# Patient Record
Sex: MALE | Race: WHITE | NOT HISPANIC OR LATINO | Employment: FULL TIME | URBAN - NONMETROPOLITAN AREA
[De-identification: names, ages, dates, MRNs, and addresses within clinical notes are randomized per-mention and may not be internally consistent; named-entity substitution may affect disease eponyms.]

---

## 2017-03-03 ENCOUNTER — APPOINTMENT (OUTPATIENT)
Dept: GENERAL RADIOLOGY | Facility: HOSPITAL | Age: 32
End: 2017-03-03
Attending: FAMILY MEDICINE

## 2017-03-03 PROCEDURE — 73562 X-RAY EXAM OF KNEE 3: CPT

## 2017-06-16 ENCOUNTER — TELEPHONE (OUTPATIENT)
Dept: GASTROENTEROLOGY | Facility: CLINIC | Age: 32
End: 2017-06-16

## 2017-06-16 NOTE — TELEPHONE ENCOUNTER
Patient is out of ppi has ov 7-25-17. i [facility] [phone]. protonix 40mg bid one month 1 refill.

## 2017-10-05 ENCOUNTER — HOSPITAL ENCOUNTER (INPATIENT)
Age: 32
LOS: 5 days | Discharge: HOME | DRG: 885 | End: 2017-10-10
Attending: EMERGENCY MEDICINE | Admitting: PSYCHIATRY & NEUROLOGY
Payer: COMMERCIAL

## 2017-10-05 DIAGNOSIS — F33.9 EPISODE OF RECURRENT MAJOR DEPRESSIVE DISORDER, UNSPECIFIED DEPRESSION EPISODE SEVERITY (HCC): Primary | ICD-10-CM

## 2017-10-05 LAB
ACETAMINOPHEN LEVEL: <15 UG/ML
AMPHETAMINE SCREEN, URINE: NEGATIVE
ANION GAP SERPL CALCULATED.3IONS-SCNC: 13 MMOL/L (ref 7–19)
BARBITURATE SCREEN URINE: NEGATIVE
BASOPHILS ABSOLUTE: 0 K/UL (ref 0–0.2)
BASOPHILS RELATIVE PERCENT: 0.5 % (ref 0–1)
BENZODIAZEPINE SCREEN, URINE: NEGATIVE
BILIRUBIN URINE: ABNORMAL
BLOOD, URINE: NEGATIVE
BUN BLDV-MCNC: 12 MG/DL (ref 6–20)
CALCIUM SERPL-MCNC: 9.4 MG/DL (ref 8.6–10)
CANNABINOID SCREEN URINE: NEGATIVE
CHLORIDE BLD-SCNC: 100 MMOL/L (ref 98–111)
CLARITY: CLEAR
CO2: 24 MMOL/L (ref 22–29)
COCAINE METABOLITE SCREEN URINE: NEGATIVE
COLOR: YELLOW
CREAT SERPL-MCNC: 0.9 MG/DL (ref 0.5–1.2)
EOSINOPHILS ABSOLUTE: 0.1 K/UL (ref 0–0.6)
EOSINOPHILS RELATIVE PERCENT: 1.1 % (ref 0–5)
ETHANOL: <10 MG/DL (ref 0–0.08)
GFR NON-AFRICAN AMERICAN: >60
GLUCOSE BLD-MCNC: 125 MG/DL (ref 74–109)
GLUCOSE URINE: NEGATIVE MG/DL
HCT VFR BLD CALC: 45 % (ref 42–52)
HEMOGLOBIN: 15.8 G/DL (ref 14–18)
KETONES, URINE: ABNORMAL MG/DL
LEUKOCYTE ESTERASE, URINE: NEGATIVE
LYMPHOCYTES ABSOLUTE: 1.4 K/UL (ref 1.1–4.5)
LYMPHOCYTES RELATIVE PERCENT: 18.1 % (ref 20–40)
Lab: NORMAL
MCH RBC QN AUTO: 31.2 PG (ref 27–31)
MCHC RBC AUTO-ENTMCNC: 35.1 G/DL (ref 33–37)
MCV RBC AUTO: 88.9 FL (ref 80–94)
MONOCYTES ABSOLUTE: 0.7 K/UL (ref 0–0.9)
MONOCYTES RELATIVE PERCENT: 8.7 % (ref 0–10)
NEUTROPHILS ABSOLUTE: 5.6 K/UL (ref 1.5–7.5)
NEUTROPHILS RELATIVE PERCENT: 71.3 % (ref 50–65)
NITRITE, URINE: NEGATIVE
OPIATE SCREEN URINE: NEGATIVE
PDW BLD-RTO: 12.6 % (ref 11.5–14.5)
PH UA: 6.5
PLATELET # BLD: 297 K/UL (ref 130–400)
PMV BLD AUTO: 10.4 FL (ref 9.4–12.4)
POTASSIUM SERPL-SCNC: 4 MMOL/L (ref 3.5–5)
PROTEIN UA: NEGATIVE MG/DL
RBC # BLD: 5.06 M/UL (ref 4.7–6.1)
SALICYLATE, SERUM: <3 MG/DL (ref 3–10)
SODIUM BLD-SCNC: 137 MMOL/L (ref 136–145)
SPECIFIC GRAVITY UA: 1.03
UROBILINOGEN, URINE: 1 E.U./DL
WBC # BLD: 7.9 K/UL (ref 4.8–10.8)

## 2017-10-05 PROCEDURE — 36415 COLL VENOUS BLD VENIPUNCTURE: CPT

## 2017-10-05 PROCEDURE — 80307 DRUG TEST PRSMV CHEM ANLYZR: CPT

## 2017-10-05 PROCEDURE — 80048 BASIC METABOLIC PNL TOTAL CA: CPT

## 2017-10-05 PROCEDURE — 6370000000 HC RX 637 (ALT 250 FOR IP): Performed by: PSYCHIATRY & NEUROLOGY

## 2017-10-05 PROCEDURE — 99285 EMERGENCY DEPT VISIT HI MDM: CPT

## 2017-10-05 PROCEDURE — G0480 DRUG TEST DEF 1-7 CLASSES: HCPCS

## 2017-10-05 PROCEDURE — 99284 EMERGENCY DEPT VISIT MOD MDM: CPT | Performed by: NURSE PRACTITIONER

## 2017-10-05 PROCEDURE — 85025 COMPLETE CBC W/AUTO DIFF WBC: CPT

## 2017-10-05 PROCEDURE — 1240000000 HC EMOTIONAL WELLNESS R&B

## 2017-10-05 PROCEDURE — 81003 URINALYSIS AUTO W/O SCOPE: CPT

## 2017-10-05 RX ORDER — CLONIDINE HYDROCHLORIDE 0.1 MG/1
0.1 TABLET ORAL EVERY 6 HOURS PRN
Status: DISCONTINUED | OUTPATIENT
Start: 2017-10-05 | End: 2017-10-10 | Stop reason: HOSPADM

## 2017-10-05 RX ORDER — TRAZODONE HYDROCHLORIDE 50 MG/1
50 TABLET ORAL ONCE
Status: DISCONTINUED | OUTPATIENT
Start: 2017-10-05 | End: 2017-10-05

## 2017-10-05 RX ORDER — TRAZODONE HYDROCHLORIDE 50 MG/1
50 TABLET ORAL NIGHTLY PRN
Status: DISCONTINUED | OUTPATIENT
Start: 2017-10-05 | End: 2017-10-10 | Stop reason: HOSPADM

## 2017-10-05 RX ORDER — FOLIC ACID 1 MG/1
1 TABLET ORAL DAILY
Status: DISCONTINUED | OUTPATIENT
Start: 2017-10-06 | End: 2017-10-10 | Stop reason: HOSPADM

## 2017-10-05 RX ORDER — MULTIVITAMIN WITH FOLIC ACID 400 MCG
1 TABLET ORAL DAILY
Status: DISCONTINUED | OUTPATIENT
Start: 2017-10-06 | End: 2017-10-10 | Stop reason: HOSPADM

## 2017-10-05 RX ORDER — LORAZEPAM 1 MG/1
2 TABLET ORAL EVERY 4 HOURS PRN
Status: DISCONTINUED | OUTPATIENT
Start: 2017-10-05 | End: 2017-10-06

## 2017-10-05 RX ORDER — THIAMINE MONONITRATE (VIT B1) 100 MG
100 TABLET ORAL DAILY
Status: DISCONTINUED | OUTPATIENT
Start: 2017-10-06 | End: 2017-10-10 | Stop reason: HOSPADM

## 2017-10-05 RX ORDER — TRAZODONE HYDROCHLORIDE 50 MG/1
50 TABLET ORAL NIGHTLY
Status: DISCONTINUED | OUTPATIENT
Start: 2017-10-05 | End: 2017-10-05

## 2017-10-05 RX ORDER — TRAZODONE HYDROCHLORIDE 50 MG/1
50 TABLET ORAL
Status: DISCONTINUED | OUTPATIENT
Start: 2017-10-05 | End: 2017-10-05

## 2017-10-05 RX ORDER — LORAZEPAM 2 MG/ML
2 CONCENTRATE ORAL EVERY 4 HOURS PRN
Status: DISCONTINUED | OUTPATIENT
Start: 2017-10-05 | End: 2017-10-05

## 2017-10-05 RX ORDER — CHLORDIAZEPOXIDE HYDROCHLORIDE 25 MG/1
50 CAPSULE, GELATIN COATED ORAL ONCE
Status: COMPLETED | OUTPATIENT
Start: 2017-10-05 | End: 2017-10-06

## 2017-10-05 RX ORDER — CLONIDINE HYDROCHLORIDE 0.1 MG/1
0.1 TABLET ORAL ONCE
Status: DISCONTINUED | OUTPATIENT
Start: 2017-10-05 | End: 2017-10-10 | Stop reason: HOSPADM

## 2017-10-05 RX ORDER — ACETAMINOPHEN 325 MG/1
650 TABLET ORAL EVERY 4 HOURS PRN
Status: DISCONTINUED | OUTPATIENT
Start: 2017-10-05 | End: 2017-10-10 | Stop reason: HOSPADM

## 2017-10-05 RX ADMIN — LORAZEPAM 2 MG: 1 TABLET ORAL at 21:50

## 2017-10-05 RX ADMIN — TRAZODONE HYDROCHLORIDE 50 MG: 50 TABLET ORAL at 21:41

## 2017-10-05 RX ADMIN — CLONIDINE HYDROCHLORIDE 0.1 MG: 0.1 TABLET ORAL at 20:29

## 2017-10-05 ASSESSMENT — ENCOUNTER SYMPTOMS
SHORTNESS OF BREATH: 0
NAUSEA: 0
BLOOD IN STOOL: 0
VOMITING: 0
ABDOMINAL PAIN: 0
CONSTIPATION: 0
DIARRHEA: 0
CHEST TIGHTNESS: 0
EYES NEGATIVE: 1
COUGH: 0

## 2017-10-05 ASSESSMENT — SLEEP AND FATIGUE QUESTIONNAIRES
DIFFICULTY ARISING: YES
RESTFUL SLEEP: NO
AVERAGE NUMBER OF SLEEP HOURS: 2
DIFFICULTY STAYING ASLEEP: YES
DO YOU USE A SLEEP AID: YES
DO YOU HAVE DIFFICULTY SLEEPING: YES
DIFFICULTY FALLING ASLEEP: YES
SLEEP PATTERN: INSOMNIA

## 2017-10-05 ASSESSMENT — PATIENT HEALTH QUESTIONNAIRE - PHQ9: SUM OF ALL RESPONSES TO PHQ QUESTIONS 1-9: 21

## 2017-10-05 ASSESSMENT — LIFESTYLE VARIABLES: HISTORY_ALCOHOL_USE: YES
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
## 2017-10-05 NOTE — ED NOTES
Assessment:    Pt respirations even and unlabored, skin color within normal limits. Skin is warm and dry. Capillary refill less than 2 seconds. Alert and oriented x 4. Pt is in no acute distress. Affect normal, pt denies SI and HI. Call light within reach, pt gowned. Suicide and elopement precautions initiated.         Bin Valverde RN  10/05/17 7916

## 2017-10-06 PROCEDURE — G0008 ADMIN INFLUENZA VIRUS VAC: HCPCS | Performed by: PSYCHIATRY & NEUROLOGY

## 2017-10-06 PROCEDURE — 90686 IIV4 VACC NO PRSV 0.5 ML IM: CPT | Performed by: PSYCHIATRY & NEUROLOGY

## 2017-10-06 PROCEDURE — 1240000000 HC EMOTIONAL WELLNESS R&B

## 2017-10-06 PROCEDURE — 6370000000 HC RX 637 (ALT 250 FOR IP): Performed by: PSYCHIATRY & NEUROLOGY

## 2017-10-06 PROCEDURE — 3E0234Z INTRODUCTION OF SERUM, TOXOID AND VACCINE INTO MUSCLE, PERCUTANEOUS APPROACH: ICD-10-PCS | Performed by: PSYCHIATRY & NEUROLOGY

## 2017-10-06 PROCEDURE — 6370000000 HC RX 637 (ALT 250 FOR IP): Performed by: NURSE PRACTITIONER

## 2017-10-06 PROCEDURE — 90792 PSYCH DIAG EVAL W/MED SRVCS: CPT | Performed by: NURSE PRACTITIONER

## 2017-10-06 PROCEDURE — 6360000002 HC RX W HCPCS: Performed by: PSYCHIATRY & NEUROLOGY

## 2017-10-06 RX ORDER — QUETIAPINE FUMARATE 50 MG/1
50 TABLET, FILM COATED ORAL NIGHTLY
Status: DISCONTINUED | OUTPATIENT
Start: 2017-10-06 | End: 2017-10-10 | Stop reason: HOSPADM

## 2017-10-06 RX ORDER — PANTOPRAZOLE SODIUM 40 MG/1
40 TABLET, DELAYED RELEASE ORAL 2 TIMES DAILY
Status: DISCONTINUED | OUTPATIENT
Start: 2017-10-06 | End: 2017-10-10 | Stop reason: HOSPADM

## 2017-10-06 RX ORDER — ESCITALOPRAM OXALATE 10 MG/1
10 TABLET ORAL DAILY
Status: DISCONTINUED | OUTPATIENT
Start: 2017-10-06 | End: 2017-10-10 | Stop reason: HOSPADM

## 2017-10-06 RX ORDER — CHLORDIAZEPOXIDE HYDROCHLORIDE 25 MG/1
25 CAPSULE, GELATIN COATED ORAL 3 TIMES DAILY
Status: DISCONTINUED | OUTPATIENT
Start: 2017-10-06 | End: 2017-10-10 | Stop reason: HOSPADM

## 2017-10-06 RX ADMIN — CHLORDIAZEPOXIDE HYDROCHLORIDE 50 MG: 25 CAPSULE ORAL at 00:05

## 2017-10-06 RX ADMIN — ESCITALOPRAM OXALATE 10 MG: 10 TABLET ORAL at 12:48

## 2017-10-06 RX ADMIN — QUETIAPINE FUMARATE 50 MG: 50 TABLET, FILM COATED ORAL at 20:13

## 2017-10-06 RX ADMIN — FOLIC ACID 1 MG: 1 TABLET ORAL at 08:22

## 2017-10-06 RX ADMIN — INFLUENZA A VIRUS A/CALIFORNIA/7/2009 X-179A (H1N1) ANTIGEN (FORMALDEHYDE INACTIVATED), INFLUENZA A VIRUS A/TEXAS/50/2012 X-223A (H3N2) ANTIGEN (FORMALDEHYDE INACTIVATED), INFLUENZA B VIRUS B/MASSACHUSETTS/2/2012 BX-51B ANTIGEN (FORMALDEHYDE INACTIVATED), AND INFLUENZA B VIRUS B/BRISBANE/60/2008 ANTIGEN (FORMALDEHYDE INACTIVATED) 0.5 ML: 15; 15; 15; 15 INJECTION, SUSPENSION INTRAMUSCULAR at 11:15

## 2017-10-06 RX ADMIN — PANTOPRAZOLE SODIUM 40 MG: 40 TABLET, DELAYED RELEASE ORAL at 20:11

## 2017-10-06 RX ADMIN — CHLORDIAZEPOXIDE HYDROCHLORIDE 25 MG: 25 CAPSULE ORAL at 20:11

## 2017-10-06 RX ADMIN — CHLORDIAZEPOXIDE HYDROCHLORIDE 25 MG: 25 CAPSULE ORAL at 13:34

## 2017-10-06 RX ADMIN — Medication 100 MG: at 08:22

## 2017-10-06 RX ADMIN — PANTOPRAZOLE SODIUM 40 MG: 40 TABLET, DELAYED RELEASE ORAL at 13:34

## 2017-10-06 RX ADMIN — THERA TABS 1 TABLET: TAB at 08:22

## 2017-10-06 NOTE — H&P
HISTORY and PHYSICAL      CHIEF COMPLAINT:  depression    Reason for Admission:  depression    History Obtained From:  patient    HISTORY OF PRESENT ILLNESS:      The patient is a 27 y.o. male who is admitted to the [place] [address] unit with worsening mood issues. He has no c/o CP or SOA. No abdominal pain or N/V. No dysuria. No fevers or recent illnesses. He has had no pain complaints. He has not been sleeping well. Past Medical History:        Diagnosis Date    Parkinson's disease [facility])      Past Surgical History:    History reviewed. No pertinent surgical history. Medications Prior to Admission:    Prescriptions Prior to Admission: PARoxetine (PAXIL) 30 MG tablet, Take 40 mg by mouth every morning   Pantoprazole Sodium (PROTONIX PO), Take 40 mg by mouth 2 times daily   zolpidem (AMBIEN CR) 12.5 MG extended release tablet, Take 12.5 mg by mouth nightly as needed for Sleep    Allergies:  Review of patient's allergies indicates no known allergies. Social History:   TOBACCO:   reports that he has never smoked. He does not have any smokeless tobacco history on file. ETOH:   reports that he drinks alcohol. DRUGS:   reports that he does not use illicit drugs. MARITAL STATUS:    OCCUPATION:  Not working  Patient currently lives alone      Family History:   History reviewed. No pertinent family history.   REVIEW OF SYSTEMS:  Constitutional: neg  CV: neg  Pulmonary: neg  GI: neg  : neg  Psych: depression, insomnia  Neuro: neg  Skin: neg  MusculoSkeletal: neg  HEENT: neg  Joints: neg    Vitals:  /80  Pulse 90  Temp 96.3 °F (35.7 °C)  Resp 16  Ht 5' 8\" (1.727 m)  Wt 190 lb (86.2 kg)  SpO2 100%  BMI 28.89 kg/m2    PHYSICAL EXAM:  Gen: NAD, alert  HEENT: WNL  Lymph: no LAD  Neck: no JVD or masses  Chest: CTA bilat  CV: RRR  Abdomen: NT/ND  Extrem: no C/C/E  Neuro: non focal  Skin: no rashes  Joints: no redness    DATA:  I have reviewed the

## 2017-10-06 NOTE — PROGRESS NOTES
Admission Note      Reason for admission/Target Symptom: increasing depression and SI  Diagnoses:  UDS: Negative- Benzo- Opiate- Amphetamines- THC- Cocaine- Barbs  BAL: Negative     SW met with treatment team to discuss patient treatment and follow up care plans. Pt was admitted to [facility]. Treatment team has  identified patients discharge needs as medication management and therapy with [address] [place] [address]. Pt validated need for appointments. Pt was also provided a handout of contact information for drug and alcohol treatment centers and other community support service such as [facility] .

## 2017-10-06 NOTE — H&P
admissions and no previous suicide attempts. Historian: patient  Complaint Type: anxiety, decreased appetite, depression, excessive alcohol consumption, fatigue, irritability, loss of interest in favorite activities, mood swings and sleep disturbance  Course of Symptoms: ongoing  Symptoms Onset: gradual  Onset Approximately: gradual  Precipitating Factors: job loss, divorce  Severity: marked  Risk Factors:   History of mental illness  Allergies:    Allergies as of 10/05/2017    (No Known Allergies)       Vital Signs:  Last set of tests and vitals:  Vitals:    10/06/17 0742   BP: 116/80   Pulse: 90   Resp: 16   Temp: 96.3 °F (35.7 °C)   SpO2: 100%     Labs Reviewed   CBC WITH AUTO DIFFERENTIAL - Abnormal; Notable for the following:        Result Value    MCH 31.2 (*)     Neutrophils % 71.3 (*)     Lymphocytes % 18.1 (*)     All other components within normal limits   BASIC METABOLIC PANEL - Abnormal; Notable for the following:     Glucose 125 (*)     All other components within normal limits   URINALYSIS - Abnormal; Notable for the following:     Bilirubin Urine SMALL (*)     Ketones, Urine TRACE (*)     All other components within normal limits   URINE DRUG SCREEN   ETHANOL   SALICYLATE LEVEL   ACETAMINOPHEN LEVEL       Current Medications:   Current Facility-Administered Medications   Medication Dose Route Frequency Provider Last Rate Last Dose    acetaminophen (TYLENOL) tablet 650 mg  650 mg Oral Q4H PRN Amando Means MD        magnesium hydroxide (MILK OF MAGNESIA) 400 MG/5ML suspension 30 mL  30 mL Oral Daily PRN Amando Means MD        influenza quadrivalent split vaccine (FLUZONE;FLUARIX) injection 0.5 mL  0.5 mL Intramuscular Once Parker Ford MD Miguelangel        cloNIDine (CATAPRES) tablet 0.1 mg  0.1 mg Oral Q6H PRN Lizbeth Haq MD   0.1 mg at 10/05/17 2029    vitamin B-1 (THIAMINE) tablet 100 mg  100 mg Oral Daily Lizbeth Haq MD   100 mg at 77/82/59 8343    folic acid (Tere Churchman) ROS: negative  Allergy and Immunology ROS: negative  Hematological and Lymphatic ROS: negative  Endocrine ROS: negative  Breast ROS: negative  Respiratory ROS: negative  Cardiovascular ROS: negative  Gastrointestinal ROS: negative  Genito-Urinary ROS: negative  Musculoskeletal ROS: negative  Neurological ROS: negative  Dermatological ROS: negative      DSM V Diagnoses:           ACTIVE MEDICAL PROBLEMS:  Patient Active Problem List   Diagnosis    Moderate episode of recurrent major depressive disorder ([facility] [address]       Recommendations:    1. Admit to [facility] Unit and monitor on 15 min checks  2. Asiya Santoro to be reviewed. 3. Collateral information from family with release  4. Medical monitoring by Dr Franklyn Javed and associates  5. Acclimate to the unit and encourage group attendance  6. Reconcile home medications  7. CIWAS every 4 hours  8. Librium 25 mg po TID -TAPER AS NEEDED  9. Folic Acid 1 mg po daily  10. Thiamine 100 mg po daily  11.  Seroquel 50 mg po nightly      JUAN JOSE Montelongo

## 2017-10-06 NOTE — PLAN OF CARE
Problem: Altered Mood, Depressive Behavior  Goal: STG-Knowledge of positive coping patterns  Outcome: Ongoing                                                                      Group Therapy Note     Date: 10/6/2017  Start Time: 1430  End Time:  2491  Number of Participants: 10     Type of Group: Cognitive     Wellness Binder Information  Module Name:  Women's Issues  Session Number:  1     Patient's Goal:  To raise awareness of the effectiveness of assertive communication     Notes:  Pt participated in group discussion, identified types of communication behavior, discussed the effectiveness of assertive communication.      Status After Intervention:  Unchanged     Participation Level: Interactive     Participation Quality: Attentive        Speech:  normal        Thought Process/Content: Logical        Affective Functioning: Congruent        Mood: anxious and depressed        Level of consciousness:  Alert and Oriented x4        Response to Learning: Able to verbalize current knowledge/experience, Able to verbalize/acknowledge new learning and Progressing to goal        Endings: None Reported     Modes of Intervention: Education        Discipline Responsible: Psychoeducational Specialist        Signature:  Haylee Haynes

## 2017-10-06 NOTE — PLAN OF CARE
Problem: Health Maintenance - Impaired:  Goal: Ability to perform activities of daily living will improve  Ability to perform activities of daily living will improve   Outcome: Ongoing  Goal: Able to sleep without medication for appropriate length of time  Able to sleep without medication for appropriate length of time   Outcome: Ongoing  Goal: Maintenance of adequate nutrition will improve  Maintenance of adequate nutrition will improve   Outcome: Ongoing    Problem: Mood - Altered:  Goal: Mood stable  Mood stable   Outcome: Ongoing    Problem: Self-Esteem - Low:  Goal: Demonstrates positive self-esteem  Demonstrates positive self-esteem   Outcome: Ongoing    Problem: Falls - Risk of  Goal: Absence of falls  Outcome: Ongoing

## 2017-10-06 NOTE — PROGRESS NOTES
Requirement Note     SW met with pt to complete Psychosocial within 72 hours, CSSRS within 24 hours, and treatment plan signature sheet within 72 hours. SW explained treatment goals with pt. Decreasing depression and anxiety by improvement of positive coping patterns was discussed. Pt acknowledged understanding of treatment goals and signed treatment plan signature sheet. In the last 6 months has the pt been a danger to self: /NO  In the last 6 months has the pt been a danger to others: NO    Provided pt with [employer] handout entitled \"Quitting Smoking. \"  Reviewed handout with pt addressing dangers of smoking, developing coping skills, and providing basic information about quitting. Patient declined practical counseling of tobacco practical counseling during the hospital stay.

## 2017-10-07 LAB
HBA1C MFR BLD: 5.5 %
TSH SERPL DL<=0.05 MIU/L-ACNC: 1.87 UIU/ML (ref 0.27–4.2)
VITAMIN B-12: 258 PG/ML (ref 211–946)
VITAMIN D 25-HYDROXY: 37.6 NG/ML

## 2017-10-07 PROCEDURE — 1240000000 HC EMOTIONAL WELLNESS R&B

## 2017-10-07 PROCEDURE — 6370000000 HC RX 637 (ALT 250 FOR IP): Performed by: PSYCHIATRY & NEUROLOGY

## 2017-10-07 PROCEDURE — 6370000000 HC RX 637 (ALT 250 FOR IP): Performed by: NURSE PRACTITIONER

## 2017-10-07 PROCEDURE — 82607 VITAMIN B-12: CPT

## 2017-10-07 PROCEDURE — 83036 HEMOGLOBIN GLYCOSYLATED A1C: CPT

## 2017-10-07 PROCEDURE — 36415 COLL VENOUS BLD VENIPUNCTURE: CPT

## 2017-10-07 PROCEDURE — 84443 ASSAY THYROID STIM HORMONE: CPT

## 2017-10-07 PROCEDURE — 82306 VITAMIN D 25 HYDROXY: CPT

## 2017-10-07 PROCEDURE — 99231 SBSQ HOSP IP/OBS SF/LOW 25: CPT | Performed by: PSYCHIATRY & NEUROLOGY

## 2017-10-07 RX ADMIN — CHLORDIAZEPOXIDE HYDROCHLORIDE 25 MG: 25 CAPSULE ORAL at 13:24

## 2017-10-07 RX ADMIN — THERA TABS 1 TABLET: TAB at 08:31

## 2017-10-07 RX ADMIN — FOLIC ACID 1 MG: 1 TABLET ORAL at 08:31

## 2017-10-07 RX ADMIN — CHLORDIAZEPOXIDE HYDROCHLORIDE 25 MG: 25 CAPSULE ORAL at 21:29

## 2017-10-07 RX ADMIN — Medication 100 MG: at 08:31

## 2017-10-07 RX ADMIN — PANTOPRAZOLE SODIUM 40 MG: 40 TABLET, DELAYED RELEASE ORAL at 21:29

## 2017-10-07 RX ADMIN — PANTOPRAZOLE SODIUM 40 MG: 40 TABLET, DELAYED RELEASE ORAL at 08:31

## 2017-10-07 RX ADMIN — QUETIAPINE FUMARATE 50 MG: 50 TABLET, FILM COATED ORAL at 21:28

## 2017-10-07 RX ADMIN — ESCITALOPRAM OXALATE 10 MG: 10 TABLET ORAL at 08:31

## 2017-10-07 RX ADMIN — CHLORDIAZEPOXIDE HYDROCHLORIDE 25 MG: 25 CAPSULE ORAL at 08:31

## 2017-10-07 RX ADMIN — TRAZODONE HYDROCHLORIDE 50 MG: 50 TABLET ORAL at 21:28

## 2017-10-07 NOTE — PROGRESS NOTES
Group Therapy Note    Date: 10/07/17                                                                                                                                                                                               Number of Participants: 13    Type of Group:morning group    Wellness Binder [id number]  Session Number:    Patient's Goal: any thin    Status After Intervention:  same    Participation Level: fair    Participation Quality: fair          Mood: good          Signature:  Danelle Vallejo

## 2017-10-07 NOTE — PLAN OF CARE
Problem: Altered Mood, Depressive Behavior  Goal: STG-Knowledge of positive coping patterns  Outcome: Ongoing

## 2017-10-07 NOTE — PROGRESS NOTES
Group Therapy Note:    Date: 10/06/17  Time: 19:30 to 20:30    Type of Group: Wrap-Up Group Session    Patient Goals: See patient self inventory sheet. Notes: Patient participated in group.       Paulette CONTRERAS

## 2017-10-08 PROCEDURE — 6370000000 HC RX 637 (ALT 250 FOR IP): Performed by: NURSE PRACTITIONER

## 2017-10-08 PROCEDURE — 1240000000 HC EMOTIONAL WELLNESS R&B

## 2017-10-08 PROCEDURE — 6370000000 HC RX 637 (ALT 250 FOR IP): Performed by: PSYCHIATRY & NEUROLOGY

## 2017-10-08 RX ADMIN — Medication 100 MG: at 08:20

## 2017-10-08 RX ADMIN — TRAZODONE HYDROCHLORIDE 50 MG: 50 TABLET ORAL at 22:51

## 2017-10-08 RX ADMIN — CHLORDIAZEPOXIDE HYDROCHLORIDE 25 MG: 25 CAPSULE ORAL at 13:52

## 2017-10-08 RX ADMIN — PANTOPRAZOLE SODIUM 40 MG: 40 TABLET, DELAYED RELEASE ORAL at 08:20

## 2017-10-08 RX ADMIN — CHLORDIAZEPOXIDE HYDROCHLORIDE 25 MG: 25 CAPSULE ORAL at 20:39

## 2017-10-08 RX ADMIN — ESCITALOPRAM OXALATE 10 MG: 10 TABLET ORAL at 08:20

## 2017-10-08 RX ADMIN — FOLIC ACID 1 MG: 1 TABLET ORAL at 08:20

## 2017-10-08 RX ADMIN — THERA TABS 1 TABLET: TAB at 08:20

## 2017-10-08 RX ADMIN — PANTOPRAZOLE SODIUM 40 MG: 40 TABLET, DELAYED RELEASE ORAL at 20:39

## 2017-10-08 RX ADMIN — CHLORDIAZEPOXIDE HYDROCHLORIDE 25 MG: 25 CAPSULE ORAL at 08:20

## 2017-10-08 RX ADMIN — QUETIAPINE FUMARATE 50 MG: 50 TABLET, FILM COATED ORAL at 20:39

## 2017-10-08 NOTE — PLAN OF CARE
Problem: Health Maintenance - Impaired:  Goal: Ability to perform activities of daily living will improve  Ability to perform activities of daily living will improve   Outcome: Ongoing    Goal: Able to sleep without medication for appropriate length of time  Able to sleep without medication for appropriate length of time   Outcome: Ongoing    Goal: Maintenance of adequate nutrition will improve  Maintenance of adequate nutrition will improve   Outcome: Ongoing      Problem: Mood - Altered:  Goal: Mood stable  Mood stable   Outcome: Ongoing      Problem: Self-Esteem - Low:  Goal: Demonstrates positive self-esteem  Demonstrates positive self-esteem   Outcome: Ongoing      Problem: Altered Mood, Depressive Behavior  Goal: LTG-Able to verbalize acceptance of life and situations over which he or she has no control  Outcome: Ongoing    Goal: LTG-Able to verbalize and/or display a decrease in depressive symptoms  Outcome: Ongoing    Goal: STG-Able to verbalize suicidal ideations  Outcome: Ongoing    Goal: STG-Able to verbalize support system  Outcome: Ongoing    Goal: LTG-Absence of self-harm  Outcome: Ongoing    Goal: STG-Knowledge of positive coping patterns  Outcome: Ongoing

## 2017-10-08 NOTE — PLAN OF CARE
Problem: Altered Mood, Depressive Behavior  Goal: LTG-Able to verbalize and/or display a decrease in depressive symptoms  Outcome: Ongoing                                                                      Group Therapy Note    Date: 10/8/2017  Start Time: 4935  End Time:  1100  Number of Participants: 16    Type of Group: Recovery    Wellness Binder Information  Module Name:  Spiritual Wellness  Session Number:  5. Patient's Goal:  Meaning    Notes: Pt discussed the meaning of values and they shared the things that are most important in their lives. Pt learned that everyone has a purpose in this life and we explored things that they enjoy and want to do in their life to try and help find their purpose. Pt learned that if they focus on the important things and what they are meant for, the depression will most likely decrease over time. Status After Intervention:  Improved    Participation Level:  Active Listener    Participation Quality: Appropriate      Speech:  normal      Thought Process/Content: Logical      Affective Functioning: Congruent      Mood: depressed      Level of consciousness:  Alert, Oriented x4 and Attentive      Response to Learning: Able to verbalize current knowledge/experience, Able to retain information and Progressing to goal      Endings: None Reported    Modes of Intervention: Support and Exploration      Discipline Responsible: Psychoeducational Specialist      Signature:  Mark Elizabeth

## 2017-10-08 NOTE — PROGRESS NOTES
Group Therapy Note    Date: 10/7/2017  Start Time: 2100  End Time:  2115  Number of Participants: 12    Type of Group: Wrap-Up    Wellness Binder Information  Module Name:    Session Number:      Patient's Goal:  Did not verbalize    Notes:  Filled out wrap up sheet    Status After Intervention:      Participation Level:     Participation Quality:       Speech:         Thought Process/Content:      Affective Functioning:       Mood:       Level of consciousness:        Response to Learning:       Endings:     Modes of Intervention:       Discipline Responsible: [address] [place] [address]      Signature:  Dania Galindo

## 2017-10-09 PROCEDURE — 6370000000 HC RX 637 (ALT 250 FOR IP): Performed by: PSYCHIATRY & NEUROLOGY

## 2017-10-09 PROCEDURE — 1240000000 HC EMOTIONAL WELLNESS R&B

## 2017-10-09 PROCEDURE — 99231 SBSQ HOSP IP/OBS SF/LOW 25: CPT | Performed by: PSYCHIATRY & NEUROLOGY

## 2017-10-09 PROCEDURE — 6370000000 HC RX 637 (ALT 250 FOR IP): Performed by: NURSE PRACTITIONER

## 2017-10-09 RX ORDER — CHOLECALCIFEROL (VITAMIN D3) 125 MCG
500 CAPSULE ORAL DAILY
Status: DISCONTINUED | OUTPATIENT
Start: 2017-10-10 | End: 2017-10-10 | Stop reason: HOSPADM

## 2017-10-09 RX ADMIN — THERA TABS 1 TABLET: TAB at 08:01

## 2017-10-09 RX ADMIN — FOLIC ACID 1 MG: 1 TABLET ORAL at 08:01

## 2017-10-09 RX ADMIN — CHLORDIAZEPOXIDE HYDROCHLORIDE 25 MG: 25 CAPSULE ORAL at 21:19

## 2017-10-09 RX ADMIN — PANTOPRAZOLE SODIUM 40 MG: 40 TABLET, DELAYED RELEASE ORAL at 21:19

## 2017-10-09 RX ADMIN — CHLORDIAZEPOXIDE HYDROCHLORIDE 25 MG: 25 CAPSULE ORAL at 08:01

## 2017-10-09 RX ADMIN — TRAZODONE HYDROCHLORIDE 50 MG: 50 TABLET ORAL at 21:22

## 2017-10-09 RX ADMIN — QUETIAPINE FUMARATE 50 MG: 50 TABLET, FILM COATED ORAL at 21:19

## 2017-10-09 RX ADMIN — ESCITALOPRAM OXALATE 10 MG: 10 TABLET ORAL at 08:01

## 2017-10-09 RX ADMIN — CHLORDIAZEPOXIDE HYDROCHLORIDE 25 MG: 25 CAPSULE ORAL at 16:05

## 2017-10-09 RX ADMIN — PANTOPRAZOLE SODIUM 40 MG: 40 TABLET, DELAYED RELEASE ORAL at 08:01

## 2017-10-09 RX ADMIN — Medication 100 MG: at 08:01

## 2017-10-09 NOTE — PLAN OF CARE
Problem: Altered Mood, Depressive Behavior  Goal: STG-Knowledge of positive coping patterns  Outcome: Ongoing                                                                      Group Therapy Note    Date: 10/9/2017  Start Time: 1430  End Time:  6850  Number of Participants: 16    Type of Group: Cognitive Skills    Wellness Binder Information  Module Name:  Spiritual wellness  Session Number:  1    Patient's Goal:  Hope    Notes:  Pt acknowledged to make positive changes you must remain hopeful.     Status After Intervention:  Improved    Participation Level: Interactive    Participation Quality: Appropriate and Attentive      Speech:  normal      Thought Process/Content: Logical      Affective Functioning: Congruent      Mood: congruent      Level of consciousness:  Alert, Oriented x4 and Attentive      Response to Learning: Able to verbalize current knowledge/experience      Endings: None Reported    Modes of Intervention: Education      Discipline Responsible: Psychoeducational Specialist      Signature:  Navdeep Mesa

## 2017-10-09 NOTE — PLAN OF CARE
Problem: Health Maintenance - Impaired:  Goal: Ability to perform activities of daily living will improve  Ability to perform activities of daily living will improve   Outcome: Ongoing    Goal: Able to sleep without medication for appropriate length of time  Able to sleep without medication for appropriate length of time   Outcome: Ongoing    Goal: Maintenance of adequate nutrition will improve  Maintenance of adequate nutrition will improve   Outcome: Ongoing      Problem: Mood - Altered:  Goal: Mood stable  Mood stable   Outcome: Ongoing      Problem: Self-Esteem - Low:  Goal: Demonstrates positive self-esteem  Demonstrates positive self-esteem   Outcome: Ongoing      Problem: Falls - Risk of  Goal: Absence of falls  Outcome: Ongoing      Problem: Altered Mood, Depressive Behavior  Goal: STG-Able to verbalize suicidal ideations  Outcome: Ongoing

## 2017-10-09 NOTE — PROGRESS NOTES
10/9/2017 12:17 PM   Progress Note        Jacob Monge 1985  Psychotherapy Time Spent: 15 min      Psychotherapy Topics: health    Chief Complaint   Patient presents with    Mental Health Problem     presents to er with c/o anxiety and depression, denies si and hi       Subjective:  Patient seen today. He says he slept very well with trazodone. Rates depression and anxiety as a 0. No suicidal ideas, intentions or plans. Patient reports side effects as follows: none. Reports no suicidal ideation. Reports compliance with medications as good . Review of Systems - all negative    Current Meds:    Prior to Admission medications    Medication Sig Start Date End Date Taking? Authorizing Provider   PARoxetine (PAXIL) 30 MG tablet Take 40 mg by mouth every morning    Yes Historical Provider, MD   Pantoprazole Sodium (PROTONIX PO) Take 40 mg by mouth 2 times daily    Yes Historical Provider, MD   zolpidem (AMBIEN CR) 12.5 MG extended release tablet Take 12.5 mg by mouth nightly as needed for Sleep   Yes Historical Provider, MD       @    MSE:  Patient is  A & O x3. Appearance:  well-appearing  Cognition:  Recent memory intact , remote memory intact , good fund of knowledge, average intelligence level. Speech:  normal  Language: Naming: intact; Word Finding: intact  Conversation no evidence of delusions  Behavior:  Cooperative  Mood: improved  Affect: congruent with mood and full range  Thought Content: negative delusions, hallucinations, obsessions, homicidal and suicidal  Thought Process: linear and goal directed  Judgement Insight:  improved and appropriate  Gait and Station:normal gait and station   Musculoskeletal:    Assesment: No diagnosis found. Plan:  1. The patient continues to need, on a daily basis, active treatment furnished directly by or requiring the supervision of inpatient psychiatric facility personnel. 2. Same medications for now  3.  Supportive therapy offered    Paz Gomes M. D.   [facility]       [unfilled]

## 2017-10-09 NOTE — PLAN OF CARE
Problem: Health Maintenance - Impaired:  Goal: Ability to perform activities of daily living will improve  Ability to perform activities of daily living will improve   Outcome: Ongoing    Goal: Able to sleep without medication for appropriate length of time  Able to sleep without medication for appropriate length of time   Outcome: Ongoing    Goal: Maintenance of adequate nutrition will improve  Maintenance of adequate nutrition will improve   Outcome: Ongoing      Problem: Mood - Altered:  Goal: Mood stable  Mood stable   Outcome: Ongoing      Problem: Self-Esteem - Low:  Goal: Demonstrates positive self-esteem  Demonstrates positive self-esteem   Outcome: Ongoing      Problem: Falls - Risk of  Goal: Absence of falls  Outcome: Ongoing      Problem: Altered Mood, Depressive Behavior  Goal: STG-Able to verbalize suicidal ideations  Outcome: Ongoing    Goal: STG-Able to verbalize support system  Outcome: Ongoing    Goal: LTG-Absence of self-harm  Outcome: Ongoing    Goal: STG-Knowledge of positive coping patterns  Outcome: Ongoing      Comments: BHI Daily Shift Assessment  Nursing Progress Note    Room: [facility]-02 Name: Vickie Habermann Age: 27 y.o. Ethnicity:  Gender: male   Dx:  Moderate episode of recurrent major depressive disorder (HCC)  Precautions: suicide risk  CPAP: No Accu-Chek: No  MSE:  Status and Exam  Normal: No  Facial Expression: Flat, Worried  Affect: Appropriate, Congruent  Level of Consciousness: Alert  Mood:Normal: No  Mood: Anxious  Motor Activity:Normal: No  Motor Activity: Decreased  Interview Behavior: Cooperative  Preception: [place] to Person, Acie Becker to Time, [place] to Place, [place] to Situation  Attention:Normal: No  Attention: Distractible  Thought Processes: Blocking  Thought Content:Normal: No  Thought Content: Preoccupations  Hallucinations: None  Delusions: No  Memory:Normal: Yes  Memory: Poor Recent, Poor Remote  Insight and Judgment: Yes  Insight and Judgment: Poor Judgment, Poor Insight  Present Suicidal Ideation: No  Present Homicidal Ideation: No  Sleep: Yes, Good, no sleep issues Hours Slept:  Sched Sleep Meds: No PRN Sleep Meds: Yes Other PRN Meds: Yes Med Compliant: Yes Appetite: improved Percent Meals: 100% Social: Yes ADLs: Yes Speech: normal Depression: 2 Anxiety: 4    Elva Young RN

## 2017-10-09 NOTE — PROGRESS NOTES
Group Therapy Note  Start Time: 09:00  End Time:  09:30  Number of Participants: 19    Type of Group: Community Meeting    Patient's Goal:  \"getting out\"    Notes:  Patient attended group and completed menu and goal sheets     Participation Level:  Active Listener    Participation Quality: Appropriate    Mood: Calm    Level of consciousness:  Alert    Discipline Responsible: Behavioral Health Tech    Signature:  Gayla Cortez

## 2017-10-09 NOTE — PROGRESS NOTES
Group Therapy Note    Date: 10/8/2017  Start Time: 2100  End Time:  2130  Number of Participants: 14    Type of Group: Wrap-Up    Wellness Binder Information  Module Name:    Session Number:      Patient's Goal:  See self inventory    Notes:  Pt participated in group and filled out wrap up sheet    Status After Intervention:  Improved    Participation Level:  Active Listener    Participation Quality: Appropriate and Attentive      Speech:  normal      Thought Process/Content: Logical      Affective Functioning: Congruent      Mood: fine      Level of consciousness:  Alert and Attentive      Response to Learning: Able to retain information and Capable of insight      Endings: None Reported    Modes of Intervention: Education, Support and Socialization      Discipline Responsible: Behavorial Health Tech      Signature:  Bri Lorenz

## 2017-10-09 NOTE — PROGRESS NOTES
Placed call to get collateral from patients mother and left a voice mail for [employer] [phone]

## 2017-10-10 VITALS
RESPIRATION RATE: 18 BRPM | OXYGEN SATURATION: 100 % | SYSTOLIC BLOOD PRESSURE: 114 MMHG | WEIGHT: 190 LBS | HEART RATE: 98 BPM | BODY MASS INDEX: 28.79 KG/M2 | TEMPERATURE: 97.1 F | DIASTOLIC BLOOD PRESSURE: 63 MMHG | HEIGHT: 68 IN

## 2017-10-10 PROCEDURE — 5130000000 HC BRIDGE APPOINTMENT

## 2017-10-10 PROCEDURE — 6370000000 HC RX 637 (ALT 250 FOR IP): Performed by: PSYCHIATRY & NEUROLOGY

## 2017-10-10 PROCEDURE — 6370000000 HC RX 637 (ALT 250 FOR IP): Performed by: FAMILY MEDICINE

## 2017-10-10 PROCEDURE — 6370000000 HC RX 637 (ALT 250 FOR IP): Performed by: NURSE PRACTITIONER

## 2017-10-10 PROCEDURE — 99238 HOSP IP/OBS DSCHRG MGMT 30/<: CPT | Performed by: PSYCHIATRY & NEUROLOGY

## 2017-10-10 RX ORDER — QUETIAPINE FUMARATE 50 MG/1
50 TABLET, FILM COATED ORAL NIGHTLY
Qty: 60 TABLET | Refills: 0 | Status: SHIPPED | OUTPATIENT
Start: 2017-10-10

## 2017-10-10 RX ORDER — ESCITALOPRAM OXALATE 10 MG/1
10 TABLET ORAL DAILY
Qty: 30 TABLET | Refills: 0 | Status: SHIPPED | OUTPATIENT
Start: 2017-10-11

## 2017-10-10 RX ADMIN — Medication 100 MG: at 10:04

## 2017-10-10 RX ADMIN — CHLORDIAZEPOXIDE HYDROCHLORIDE 25 MG: 25 CAPSULE ORAL at 10:03

## 2017-10-10 RX ADMIN — FOLIC ACID 1 MG: 1 TABLET ORAL at 10:03

## 2017-10-10 RX ADMIN — PANTOPRAZOLE SODIUM 40 MG: 40 TABLET, DELAYED RELEASE ORAL at 10:03

## 2017-10-10 RX ADMIN — THERA TABS 1 TABLET: TAB at 10:03

## 2017-10-10 RX ADMIN — ESCITALOPRAM OXALATE 10 MG: 10 TABLET ORAL at 10:03

## 2017-10-10 RX ADMIN — CYANOCOBALAMIN TAB 500 MCG 500 MCG: 500 TAB at 10:03

## 2017-10-10 NOTE — PLAN OF CARE
Problem: Altered Mood, Depressive Behavior  Goal: STG-Knowledge of positive coping patterns  Outcome: Ongoing                                                                      Group Therapy Note    Date: 10/10/2017  Start Time: 1100  End Time:  6241  Number of Participants: 16    Type of Group: Psychoeducation    Wellness Binder Information  Module Name:  Staying well  Session Number:  1    Patient's Goal:  Daily maintenance and coping skills    Notes:  Pt acknowledged use of positive coping skill daily to help maintain wellness.     Status After Intervention:  Improved    Participation Level: Interactive    Participation Quality: Appropriate, Attentive and Sharing      Speech:  normal      Thought Process/Content: Logical      Affective Functioning: Congruent      Mood: congruent      Level of consciousness:  Alert, Oriented x4 and Attentive      Response to Learning: Able to verbalize current knowledge/experience      Endings: None Reported    Modes of Intervention: Education      Discipline Responsible: Psychoeducational Specialist      Signature:  Jacob Gale

## 2017-10-10 NOTE — DISCHARGE SUMMARY
Discharge Summary     Patient ID:  Ratna Hy  [id number]  10 y.o.  1985    Admit date: 10/5/2017  Discharge date: 10/10/2017    Admitting Physician: Sabrina Peraza MD   Attending Physician: Sabrina Peraza MD  Discharge Physician: Sabrina Peraza     Admission Diagnoses: MDD (major depressive disorder), recurrent, severe, with psychosis ([facility] [address] [F33.3]  Depression, unspecified depression type [F32.9]    Discharge Diagnoses: Depression    Admission Condition: poor    Discharged Condition: improved    Indication for Admission: Patient is a 28 y/o c/m who presents with depression, decreased functioning at home, feelings of worthlessness and feeling helpless. UDS negative, BAL NEGATIVE. Patients mother reported in the ER that he has been sleeping all day, barely eating, and not taking care of himself in general. Patient reports that since his divorce two years ago his drinking has increased. Patient reports that he currently drinks a fifth of Gin every two days. Patient reports that he has always had problems with insomnia and was one time abusing Ambien    Hospital Course: Pt was admityted to the unit and oriented to it. Meds were adjusted. He improved quickly. He was able to consistently deny SI, I or P. Reported no SE's to meds.      Consults: Dr Kain Walter for medical    Significant Diagnostic Studies: routine labs    Treatments: medication, groups and individual counseling    Discharge Exam:  MNL    Disposition: home    Patient Instructions:   Current Discharge Medication List      START taking these medications    Details   escitalopram (LEXAPRO) 10 MG tablet Take 1 tablet by mouth daily  Qty: 30 tablet, Refills: 0      QUEtiapine (SEROQUEL) 50 MG tablet Take 1 tablet by mouth nightly  Qty: 60 tablet, Refills: 0         CONTINUE these medications which have NOT CHANGED    Details   Pantoprazole Sodium (PROTONIX PO) Take 40 mg by mouth 2 times daily          STOP taking these medications       PARoxetine (PAXIL) 30 MG tablet Comments:   Reason for Stopping:         zolpidem (AMBIEN CR) 12.5 MG extended release tablet Comments:   Reason for Stopping:             Activity: activity as tolerated  Diet: regular diet  Wound Care: none needed    Follow-up with per SW.     Time worked: Less than 30 minutes    Participation:good    Electronically signed by Nazia Thomas MD on 10/10/2017 at 11:01 AM

## 2017-10-10 NOTE — PROGRESS NOTES
Treatment Team Note:     [facility] met with [facility] team to discuss Pts TX and DC plans.      Progress/Behavior/Group Attendance: TBD     Target Symptoms/Reason for admission:      Diagnoses:      UDS: Neg- Benzo-Opiate- Amphetamines- THC-Cocaine- Barbs                   BAL: Neg     AftercarePlan: [address]     Pt lives with: SHITAL will meet with pt to gather information.     Collateral obtained from: SHITAL will meet with pt to gather release of information.   On:     Family Session: MEGHANA     Misc:

## 2017-10-10 NOTE — BH NOTE
[facility]  Discharge Note    Pt discharged with followings belongings:       Valuables sent home with patient upon discharge. Valuables retrieved from safe and returned to patient. Patient left department with  mother via personal vehicle. Mobility at Departure: Ambulatory, discharged to Discharged to: Private Residence.  Patient education on aftercare instructions: Provided and reviewed with pt and pt verbalized understanding of AVS.    Status EXAM upon discharge:  Status and Exam  Normal: Yes  Facial Expression: Brightened  Affect: Appropriate  Level of Consciousness: Alert  Mood:Normal: Yes  Mood: Anxious  Motor Activity:Normal: Yes  Motor Activity: Decreased  Interview Behavior: Cooperative  Preception: [place] to Person, Cheryal Banter to Time, [place] to Situation  Attention:Normal: Yes  Attention: Distractible  Thought Processes: Blocking  Thought Content:Normal: Yes  Thought Content: Preoccupations  Hallucinations: None  Delusions: No  Memory:Normal: Yes  Memory: Poor Recent, Poor Remote  Insight and Judgment: No  Insight and Judgment: Poor Judgment  Present Suicidal Ideation: No  Present Homicidal Ideation: No    Vincenzo Frank, VANDANA
BHI Daily Shift Assessment  Nursing Progress Note    Room: 0602/602-02 Name: Dia Kumari Age: 27 y.o. Ethnicity:  Gender: male   Dx: <principal problem not specified>  Precautions: suicide risk  CPAP: No Accu-Chek: No  MSE:  Status and Exam  Normal: No  Facial Expression: Brightened, Flat, Sad  Affect: Appropriate  Level of Consciousness: Alert  Mood:Normal: No  Mood: Depressed, Anxious, Helpless, Sad, Worthless, low self-esteem, Anhedonia, Ashamed/humiliated  Motor Activity:Normal: No  Motor Activity: Decreased  Interview Behavior: Cooperative  Preception: [place] to Person, [place] to Time, [place] to Place, [place] to Situation  Attention:Normal: No  Attention: Distractible  Thought Processes:  (racing)  Thought Content:Normal: No  Thought Content: Preoccupations  Hallucinations: None  Delusions: No  Memory:Normal: No  Memory: Poor Recent, Poor Remote  Insight and Judgment: No  Insight and Judgment: Poor Judgment, Poor Insight  Present Suicidal Ideation: No  Present Homicidal Ideation: No  Sleep: Yes, Fair, has difficulty falling asleep, has frequent nighttime awakenings and is not rested upon awakening  Sched Sleep Meds: No PRN Sleep Meds: Yes Other PRN Meds: Yes Med Compliant: Yes Appetite: good Percent Meals: 100% Social: Yes ADLs: Yes Speech: normal Depression: 8 Anxiety: 8    Pt noted with flat to bright affects, very pleasant to staff and peers, interacted well in the day area this PM. Upon PM assessment, pt reported feeling anxious but denied SI HI or AVH. He endorses having racing thoughts, especially at night when he is trying to go to sleep. Pt sleeping on and off in bed at present.     Marjan Zapata, RN
BHI Daily Shift Assessment  Nursing Progress Note    Room: 0602/602-02 Name: Wesley Ghosh Age: 27 y.o. Ethnicity:  Gender: male   Dx: Moderate episode of recurrent major depressive disorder (HCC)  Precautions: suicide risk  CPAP: No Accu-Chek: No  MSE:  Status and Exam  Normal: No  Facial Expression: Flat, Worried  Affect: Appropriate, Congruent  Level of Consciousness: Alert  Mood:Normal: No  Mood: Anxious  Motor Activity:Normal: No  Motor Activity: Decreased  Interview Behavior: Cooperative  Preception: [place] to Person, Cheryal Banter to Time, [place] to Place, [place] to Situation  Attention:Normal: Yes  Attention: Distractible  Thought Processes: Blocking  Thought Content:Normal: No  Thought Content: Preoccupations  Hallucinations: None  Delusions: No  Memory:Normal: Yes  Memory: Poor Recent, Poor Remote  Insight and Judgment: Yes  Insight and Judgment: Poor Judgment, Poor Insight  Present Suicidal Ideation: No  Present Homicidal Ideation: No  Sleep: Yes, Good, has difficulty falling asleep, has interrupted sleep and has frequent nighttime awakenings Hours Slept: 7 Sched Sleep Meds: Yes PRN Sleep Meds: Yes Other PRN Meds: No  Med Compliant: Yes Appetite: good Percent Meals: 100% Social: Yes ADLs: Yes Speech: normal Depression: 0 Anxiety: 8      Pt noted with flat affect and anxious mood, but did smile a couple of times during PM assessment. Pt stated that his source of anxiety and the biggest stressor and reason that he came for help is d/t being anxious that he won't be able to sleep at night. He said that he used to rely heavily on Ambien for a long time, but when he stopped taking it, he started relying on alcohol and knows that he needs to stop that. Pt denied w/d s/s except for anxiety, denied SI HI or AVH. Pt asleep in bed at present.   Evan Alvarez RN
Pt given Librium 50mg at this time. Pt laying down in bed, dozing off and on.
Pt repeat BP at this time after Clonidine 1 hour ago is 144/120 manually. This RN gave pt Ativan 2mg po at this time. Pt still denies w/d s/s and is calm.
Pts /104 manually, P 95. Pt denied having any alcohol w/d s/s this PM, anxiety 5/10 gave him CIWA score of 5. Clonidine 0.1 mg given at this time. Pt calm, pleasant and cooperative.
consequences of chemical use:    Substance/Chemical Abuse/Recreational Drug History:   Age of first substance use:  denies  Substance used:    Date of last substance use:    Substance used:alcohol   Tobacco use - denies  Packs  Age  Opiates: It was discussed with pt they would not be receiving opiates unless they were within 5 days post surgery/injury. Patient voiced understanding and agreed. Psychiatric Review Of Systems:     Recent Sleep changes:  Does not sleep at night, sleeps all day, does nothing  Average Hours per night  With sleep aid:   Restful sleep:   Difficulty falling asleep:    Difficulty staying asleep:  Difficulty awakening:      Recent appetite changes:  Yes - decreased  Recent weight changes:  no  Pounds gained (+) or lost (-) :     Energy level changes: decreased no motivation  Interest/pleasure/anhedonia:   seldome    Medical History:     Medical Diagnosis/Issues:   Use of 02 or CPAP:  denies  Ambulatory:   Independent Self Care:   Use of OTC:   Somatic symptoms:    PCP: Tony Ray MD     Current Medications:   Scheduled Meds: No current facility-administered medications for this encounter.      Current Outpatient Prescriptions:     PARoxetine (PAXIL) 30 MG tablet, Take 30 mg by mouth every morning, Disp: , Rfl:     Pantoprazole Sodium (PROTONIX PO), Take 50 mg by mouth daily, Disp: , Rfl:     zolpidem (AMBIEN CR) 12.5 MG extended release tablet, Take 12.5 mg by mouth nightly as needed for Sleep, Disp: , Rfl:        Mental Status Evaluation:     Appearance:  overweight and tattooed   Behavior:  Restless & fidgety   Speech:  delayed and soft   Mood:  anxious, depressed and sad   Affect:  flat   Thought Process:  circumstantial   Thought Content:  Depressed, denial   Sensorium:  person, place, time/date, situation, day of week and month of year   Cognition:  grossly intact   Insight:  fair   Judgment:  fair     Social Information:    Education:  [employer] school - [employer]

## 2017-10-10 NOTE — PROGRESS NOTES
Progress Note  Maite Tammy  10/9/2017 8:47 PM  Subjective:   Admit Date:   10/5/2017      CC/ADMIT DX:       Interval History:   Reviewed overnight events and nursing notes. No new physical complaints. I have reviewed all labs/diagnostics from the last 24hrs. ROS:   I have done a 10 point ROS and all are negative, except what is mentioned in the HPI. DIET GENERAL;    Medications:       [START ON 10/10/2017] vitamin B-12  500 mcg Oral Daily    pantoprazole  40 mg Oral BID    chlordiazePOXIDE  25 mg Oral TID    escitalopram  10 mg Oral Daily    QUEtiapine  50 mg Oral Nightly    thiamine  100 mg Oral Daily    folic acid  1 mg Oral Daily    multivitamin  1 tablet Oral Daily    cloNIDine  0.1 mg Oral Once           Objective:   Vitals: /83   Pulse 86   Temp 97.8 °F (36.6 °C) (Temporal)   Resp 15   Ht 5' 8\" (1.727 m)   Wt 190 lb (86.2 kg)   SpO2 100%   BMI 28.89 kg/m²  No intake or output data in the 24 hours ending 10/09/17 2047  General appearance: alert and cooperative with exam  Lungs: clear to auscultation bilaterally  Heart: RRR  Abdomen: soft, non-tender; bowel sounds normal; no masses,  no organomegaly  Extremities: extremities normal, atraumatic, no cyanosis or edema  Neurologic:  No obvious focal neurologic deficits. Assessment and Plan:   Principal Problem: Moderate episode of recurrent major depressive disorder (HCC)     GERD    Plan:  1. Continue present medication(s)   2. Replace Vit B12, level in low normal  3. Follow with Psych      Discharge planning:   her home     Reviewed treatment plans with the patient and/or family.              Electronically signed by Frances Jimenez MD on 10/9/2017 at 8:47 PM

## 2017-10-10 NOTE — PROGRESS NOTES
Group Therapy Note    Start Time: 900  End Time:  110  Number of Participants: 18    Type of Group: Community Meeting       Patient's Goal:  \"social skills\"      Notes:        Participation Level:  Active Listener       Participation Quality: Appropriate      Thought Process/Content: Logical      Affective Functioning: Congruent      Mood: calm      Level of consciousness:  Alert      Modes of Intervention: Support      Discipline Responsible: Behavioral Health Tech II      Signature:  Cristela Howard

## 2017-10-10 NOTE — PROGRESS NOTES
Patient in day area, pleasant and cooperative with staff and peers. Med compliant. Denies any depression or anxiety.   Denies SI, HI, and AVH

## 2017-10-10 NOTE — PROGRESS NOTES
Discharge Note     Pt discharging on this date. Pt denies SI, HI, and AVH at this time. Pt reports improvement in behavior and is leaving unit in overall good condition. SW and pt discussed pt's follow up appointments and importance of attending appointments as scheduled, pt voiced understanding and agreement. Pt and SW also discussed pt safety plan and pt able to verbally identify: warning signs, coping strategies, places and people that help make the pt feel better/distract negative thoughts, friends/family/agencies/professionals the pt can reach out to in a crisis, and something that is important to the pt/worth living for. Pt provided the national suicide prevention hotline number (6-827-144-7818) as well as local community behavioral health [facility]) crisis number for emergencies (7-691-276-797-561-3574). Pt to follow up with:  [address]) on 10__/17__/17 @ 8:00 AM/. Patient will follow up with Dr. Jackie Mckinnon at [address]  for medication management, patient will be seen on 10__/19__/17 @ 11:00 am for the med management appt.      Referral to out patient tobacco cessation counseling treatment:    Patient refused tobacco cessation counseling    SW offered to assist pt with transportation, pt reports that he has transportation